# Patient Record
Sex: FEMALE | Race: WHITE | Employment: UNEMPLOYED | URBAN - METROPOLITAN AREA
[De-identification: names, ages, dates, MRNs, and addresses within clinical notes are randomized per-mention and may not be internally consistent; named-entity substitution may affect disease eponyms.]

---

## 2017-04-05 ENCOUNTER — HOSPITAL ENCOUNTER (OUTPATIENT)
Dept: MRI IMAGING | Age: 52
Discharge: HOME | End: 2017-04-05
Attending: FAMILY MEDICINE
Payer: MEDICAID

## 2017-04-05 ENCOUNTER — HOSPITAL ENCOUNTER (OUTPATIENT)
Dept: CT IMAGING | Age: 52
Discharge: HOME | End: 2017-04-05
Attending: FAMILY MEDICINE
Payer: MEDICAID

## 2017-04-05 DIAGNOSIS — R51.9 HEAD ACHE: ICD-10-CM

## 2017-04-05 DIAGNOSIS — R04.89: ICD-10-CM

## 2017-04-05 PROCEDURE — 82565 ASSAY OF CREATININE: CPT

## 2017-04-05 PROCEDURE — 70551 MRI BRAIN STEM W/O DYE: CPT

## 2017-04-05 PROCEDURE — 71260 CT THORAX DX C+: CPT

## 2017-09-19 PROBLEM — K21.9 GERD (GASTROESOPHAGEAL REFLUX DISEASE): Status: ACTIVE | Noted: 2017-09-19

## 2017-09-19 PROBLEM — Z12.11 SCREEN FOR COLON CANCER: Status: ACTIVE | Noted: 2017-09-19

## 2017-11-16 ENCOUNTER — HOSPITAL ENCOUNTER (OUTPATIENT)
Dept: MAMMOGRAPHY | Age: 52
Discharge: HOME | End: 2017-11-16
Attending: FAMILY MEDICINE
Payer: MEDICAID

## 2017-11-16 DIAGNOSIS — Z12.39 ENCOUNTER FOR SCREENING FOR MALIGNANT NEOPLASM OF BREAST: ICD-10-CM

## 2017-11-16 PROCEDURE — 77067 SCR MAMMO BI INCL CAD: CPT | Performed by: FAMILY MEDICINE

## 2017-11-28 ENCOUNTER — NURSE NAVIGATOR ENCOUNTER (OUTPATIENT)
Dept: HEMATOLOGY/ONCOLOGY | Facility: HOSPITAL | Age: 52
End: 2017-11-28

## 2017-11-28 ENCOUNTER — HOSPITAL ENCOUNTER (OUTPATIENT)
Dept: ULTRASOUND IMAGING | Facility: HOSPITAL | Age: 52
Discharge: HOME | End: 2017-11-28
Attending: FAMILY MEDICINE
Payer: MEDICAID

## 2017-11-28 ENCOUNTER — HOSPITAL ENCOUNTER (OUTPATIENT)
Dept: MAMMOGRAPHY | Facility: HOSPITAL | Age: 52
Discharge: HOME | End: 2017-11-28
Attending: FAMILY MEDICINE
Payer: MEDICAID

## 2017-11-28 DIAGNOSIS — R92.8 ABNORMAL MAMMOGRAM: ICD-10-CM

## 2017-11-28 PROCEDURE — 76642 ULTRASOUND BREAST LIMITED: CPT | Performed by: FAMILY MEDICINE

## 2017-11-28 PROCEDURE — 77065 DX MAMMO INCL CAD UNI: CPT | Performed by: FAMILY MEDICINE

## 2017-11-28 NOTE — PROGRESS NOTES
Navigator called to patient to discuss breast biopsy recommendation from this morning. Message left for pt to call Navigator back.

## 2017-11-29 NOTE — PROGRESS NOTES
Received message from patient's daughter requesting to schedule an appointment. Navigator called to patient. Conversation assisted by  #[id number]. Discussed recommended breast biopsy.     Reviewed pertinent patient history, family history of ca procedure. Discussed with patient that some soreness and bruising is normal after biopsy but that prolonged or increased pain and bruising should be reported to the ordering physician.    Reviewed results process with patient and discussed that Alanna Ayala

## 2017-12-07 ENCOUNTER — NURSE NAVIGATOR ENCOUNTER (OUTPATIENT)
Dept: HEMATOLOGY/ONCOLOGY | Facility: HOSPITAL | Age: 52
End: 2017-12-07

## 2017-12-07 NOTE — PROGRESS NOTES
Navigator called to patient with the assistance of  #[id number]. Following up from patient cancelling breast biopsy (originally scheduled for today, 12/7/17).       Per patient, she wishes to wait to have breast biopsy completed after the holidays,

## 2018-01-08 ENCOUNTER — NURSE NAVIGATOR ENCOUNTER (OUTPATIENT)
Dept: HEMATOLOGY/ONCOLOGY | Facility: HOSPITAL | Age: 53
End: 2018-01-08

## 2018-01-08 NOTE — PROGRESS NOTES
Navigator called to patient to follow-up from breast biopsy recommendation and previous conversations regarding breast biopsy and potentially scheduling appt after the new year. Message left for patient to call back.

## 2018-01-11 ENCOUNTER — NURSE NAVIGATOR ENCOUNTER (OUTPATIENT)
Dept: HEMATOLOGY/ONCOLOGY | Facility: HOSPITAL | Age: 53
End: 2018-01-11

## 2018-01-11 NOTE — PROGRESS NOTES
Navigator called to patient to follow-up regarding breast biopsy - with the assistance of  #[id number]. Message was left for pt to call back.

## 2018-01-15 ENCOUNTER — NURSE NAVIGATOR ENCOUNTER (OUTPATIENT)
Dept: HEMATOLOGY/ONCOLOGY | Facility: HOSPITAL | Age: 53
End: 2018-01-15

## 2018-01-15 NOTE — PROGRESS NOTES
Called to patient with the assistance of  #[id number]. A message was left requesting the patient to call Navigator back.

## 2018-01-16 ENCOUNTER — NURSE NAVIGATOR ENCOUNTER (OUTPATIENT)
Dept: HEMATOLOGY/ONCOLOGY | Facility: HOSPITAL | Age: 53
End: 2018-01-16

## 2018-01-16 NOTE — PROGRESS NOTES
Navigator called to Dr. Garcia Overall office to notify of unsuccessful attempts to reach patient to schedule breast biopsy.  (1/8/18 message left in [address] and 1/11 + 1/15 - messages left in [place] for patient to return call - see previous encounters.)  Pt was

## 2018-01-24 ENCOUNTER — NURSE NAVIGATOR ENCOUNTER (OUTPATIENT)
Dept: HEMATOLOGY/ONCOLOGY | Facility: HOSPITAL | Age: 53
End: 2018-01-24

## 2018-01-24 NOTE — PROGRESS NOTES
Navigator called to patient to discuss breast biopsy and coordination of appointments. Call assisted by  #[id number]. Message left for patient to call Navigator or Radiology Scheduling back.

## 2018-01-29 ENCOUNTER — NURSE NAVIGATOR ENCOUNTER (OUTPATIENT)
Dept: HEMATOLOGY/ONCOLOGY | Facility: HOSPITAL | Age: 53
End: 2018-01-29

## 2018-01-29 NOTE — PROGRESS NOTES
Called to patient with the assistance of  #[id number]. Medications reviewed with patient.       - Discussed with patient Radiology’s protocol for being off blood thinning medications (including ibuprofen/advil/aleve/aspirin) 5 days prior to biopsy

## 2018-01-30 ENCOUNTER — NURSE NAVIGATOR ENCOUNTER (OUTPATIENT)
Dept: HEMATOLOGY/ONCOLOGY | Facility: HOSPITAL | Age: 53
End: 2018-01-30

## 2018-01-30 NOTE — PROGRESS NOTES
Called to patient with the assistance of  #[id number] to follow-up regarding breast biopsy scheduling. Pt wishes to schedule breast biopsy at this time. Patient and  transferred to discuss with Jasson in Radiology scheduling.

## 2018-02-02 ENCOUNTER — HOSPITAL ENCOUNTER (OUTPATIENT)
Dept: MAMMOGRAPHY | Facility: HOSPITAL | Age: 53
Discharge: HOME | End: 2018-02-02
Attending: FAMILY MEDICINE
Payer: MEDICAID

## 2018-02-02 ENCOUNTER — HOSPITAL ENCOUNTER (OUTPATIENT)
Dept: ULTRASOUND IMAGING | Facility: HOSPITAL | Age: 53
Discharge: HOME | End: 2018-02-02
Attending: FAMILY MEDICINE
Payer: MEDICAID

## 2018-02-02 VITALS
OXYGEN SATURATION: 97 % | SYSTOLIC BLOOD PRESSURE: 137 MMHG | HEART RATE: 84 BPM | RESPIRATION RATE: 16 BRPM | DIASTOLIC BLOOD PRESSURE: 76 MMHG

## 2018-02-02 DIAGNOSIS — N63.0 BREAST MASS: ICD-10-CM

## 2018-02-02 PROCEDURE — 19083 BX BREAST 1ST LESION US IMAG: CPT | Performed by: FAMILY MEDICINE

## 2018-02-02 PROCEDURE — 19084 BX BREAST ADD LESION US IMAG: CPT | Performed by: FAMILY MEDICINE

## 2018-02-02 PROCEDURE — 77065 DX MAMMO INCL CAD UNI: CPT | Performed by: FAMILY MEDICINE

## 2018-02-02 PROCEDURE — 88305 TISSUE EXAM BY PATHOLOGIST: CPT | Performed by: FAMILY MEDICINE

## 2018-02-02 NOTE — IMAGING NOTE
PT ARRIVED TO ROOM 4   SCANS BY [address] [place] [address];  HX TAKEN PROCEDURE EXPLAINED QUESTIONS ANSWERED;    Pt history discussed as below:                                                Family history of cancer:                  Father - prostate

## 2018-02-02 NOTE — PROCEDURES
[facility]  Procedure Note    Theodora Wilcox Patient Status:  Outpatient    1965 MRN [id number]   Location [address] [facility] Attending Orville Malloy MD   Hosp Day # 0 PCP Jasmyn Quinn MD     Procedure: Joey Klein

## 2018-02-05 ENCOUNTER — NURSE NAVIGATOR ENCOUNTER (OUTPATIENT)
Dept: HEMATOLOGY/ONCOLOGY | Facility: HOSPITAL | Age: 53
End: 2018-02-05

## 2018-02-05 NOTE — PROGRESS NOTES
Called to patient with the assistance of  #[id number]. Patient was referred to Results Clinic by Dr. Dorian Carter. Pt noted post-biopsy pain is better today; discussed notifying Dr. Dorian Carter if pain does not go away or if it gets worse.       Discussed benign

## 2018-02-20 ENCOUNTER — MED REC SCAN ONLY (OUTPATIENT)
Dept: HEMATOLOGY/ONCOLOGY | Facility: HOSPITAL | Age: 53
End: 2018-02-20

## 2019-09-25 ENCOUNTER — HOSPITAL ENCOUNTER (OUTPATIENT)
Age: 54
Discharge: LEFT BEFORE DISCHARGE | End: 2019-09-25
Attending: EMERGENCY MEDICINE
Payer: MEDICAID

## 2019-09-25 ENCOUNTER — HOSPITAL ENCOUNTER (OUTPATIENT)
Dept: GENERAL RADIOLOGY | Age: 54
Discharge: HOME | End: 2019-09-25
Attending: FAMILY MEDICINE
Payer: MEDICAID

## 2019-09-25 DIAGNOSIS — M54.50 LUMBAGO: ICD-10-CM

## 2019-09-25 PROCEDURE — 72110 X-RAY EXAM L-2 SPINE 4/>VWS: CPT | Performed by: FAMILY MEDICINE

## 2019-10-04 ENCOUNTER — HOSPITAL ENCOUNTER (OUTPATIENT)
Dept: MRI IMAGING | Age: 54
Discharge: HOME | End: 2019-10-04
Attending: FAMILY MEDICINE
Payer: MEDICAID

## 2019-10-04 DIAGNOSIS — M48.00 SPINAL STENOSIS: ICD-10-CM

## 2019-10-04 PROCEDURE — 72148 MRI LUMBAR SPINE W/O DYE: CPT | Performed by: FAMILY MEDICINE

## 2021-05-19 ENCOUNTER — HOSPITAL ENCOUNTER (EMERGENCY)
Facility: HOSPITAL | Age: 56
Discharge: HOME | End: 2021-05-20
Attending: EMERGENCY MEDICINE
Payer: MEDICAID

## 2021-05-19 ENCOUNTER — APPOINTMENT (OUTPATIENT)
Dept: CT IMAGING | Facility: HOSPITAL | Age: 56
End: 2021-05-19
Attending: EMERGENCY MEDICINE
Payer: MEDICAID

## 2021-05-19 DIAGNOSIS — R42 DIZZINESS: Primary | ICD-10-CM

## 2021-05-19 DIAGNOSIS — R51.9 NONINTRACTABLE HEADACHE, UNSPECIFIED CHRONICITY PATTERN, UNSPECIFIED HEADACHE TYPE: ICD-10-CM

## 2021-05-19 PROCEDURE — 84484 ASSAY OF TROPONIN QUANT: CPT | Performed by: EMERGENCY MEDICINE

## 2021-05-19 PROCEDURE — 80048 BASIC METABOLIC PNL TOTAL CA: CPT | Performed by: EMERGENCY MEDICINE

## 2021-05-19 PROCEDURE — 80048 BASIC METABOLIC PNL TOTAL CA: CPT

## 2021-05-19 PROCEDURE — 85025 COMPLETE CBC W/AUTO DIFF WBC: CPT | Performed by: EMERGENCY MEDICINE

## 2021-05-19 PROCEDURE — 84484 ASSAY OF TROPONIN QUANT: CPT

## 2021-05-19 PROCEDURE — 93010 ELECTROCARDIOGRAM REPORT: CPT | Performed by: EMERGENCY MEDICINE

## 2021-05-19 PROCEDURE — 99285 EMERGENCY DEPT VISIT HI MDM: CPT

## 2021-05-19 PROCEDURE — 93005 ELECTROCARDIOGRAM TRACING: CPT

## 2021-05-19 PROCEDURE — 36415 COLL VENOUS BLD VENIPUNCTURE: CPT

## 2021-05-19 PROCEDURE — 99284 EMERGENCY DEPT VISIT MOD MDM: CPT

## 2021-05-19 PROCEDURE — 85025 COMPLETE CBC W/AUTO DIFF WBC: CPT

## 2021-05-19 PROCEDURE — 70450 CT HEAD/BRAIN W/O DYE: CPT | Performed by: EMERGENCY MEDICINE

## 2021-05-19 RX ORDER — DIAZEPAM 5 MG/1
5 TABLET ORAL ONCE
Status: COMPLETED | OUTPATIENT
Start: 2021-05-19 | End: 2021-05-19

## 2021-05-19 RX ORDER — OXYBUTYNIN CHLORIDE 5 MG/1
5 TABLET ORAL 3 TIMES DAILY
COMMUNITY

## 2021-05-19 RX ORDER — OMEPRAZOLE 20 MG/1
20 CAPSULE, DELAYED RELEASE ORAL
COMMUNITY

## 2021-05-19 RX ORDER — BUDESONIDE AND FORMOTEROL FUMARATE DIHYDRATE 160; 4.5 UG/1; UG/1
AEROSOL RESPIRATORY (INHALATION) 2 TIMES DAILY
COMMUNITY

## 2021-05-20 VITALS
DIASTOLIC BLOOD PRESSURE: 66 MMHG | BODY MASS INDEX: 33.99 KG/M2 | HEIGHT: 61 IN | OXYGEN SATURATION: 97 % | WEIGHT: 180 LBS | RESPIRATION RATE: 18 BRPM | HEART RATE: 67 BPM | TEMPERATURE: 97 F | SYSTOLIC BLOOD PRESSURE: 120 MMHG

## 2021-05-20 RX ORDER — MECLIZINE HYDROCHLORIDE 25 MG/1
25 TABLET ORAL 4 TIMES DAILY
Qty: 20 TABLET | Refills: 0 | Status: SHIPPED | OUTPATIENT
Start: 2021-05-20 | End: 2021-06-09

## 2021-05-20 NOTE — ED PROVIDER NOTES
Patient Seen in: [facility] AND [facility] Emergency Department    History   Patient presents with:  Dizziness      HPI    The patient presents to the ED complaining of intermittent high blood pressure today as well as intermittent dizziness.   She states that [facility] was performed prior to and after the exam.  Stethoscope and any equipment used during my examination was cleaned with super sani-cloth germicidal wipes following the exam.     Physical Exam  Vitals and nursing note reviewed.    Constitutional:       General Final result                                                 Please view results for these tests on the individual orders.    RAINBOW DRAW LAVENDER   RAINBOW DRAW LIGHT GREEN   RAINBOW DRAW GOLD   CBC W/ DIFFERENTIAL     EKG    Rate, 75 67   Resp: 18    Temp: 97.2 °F (36.2 °C)    TempSrc: Temporal    SpO2: 97% 97%   Weight: 81.6 kg    Height: 154.9 cm (5' 1\")      *I personally reviewed and interpreted all ED vitals.     Pulse Ox: 97%, Room air, Normal     Monitor Interpretation:   nor

## 2021-05-20 NOTE — ED INITIAL ASSESSMENT (HPI)
Pt ambulatory into triage with steady gait. Pt c/o dizziness and fluctuating BPs today. Pt describes dizziness as feeling like she is walking on clouds. Pt is aox4.   Denies CP, SOB

## 2022-12-12 ENCOUNTER — HOSPITAL ENCOUNTER (OUTPATIENT)
Dept: ULTRASOUND IMAGING | Facility: HOSPITAL | Age: 57
Discharge: HOME | End: 2022-12-12
Attending: FAMILY MEDICINE
Payer: MEDICAID

## 2022-12-12 DIAGNOSIS — E04.1 NONTOXIC SINGLE THYROID NODULE: ICD-10-CM

## 2022-12-12 PROCEDURE — 76536 US EXAM OF HEAD AND NECK: CPT | Performed by: FAMILY MEDICINE

## 2023-12-12 ENCOUNTER — HOSPITAL ENCOUNTER (OUTPATIENT)
Dept: GENERAL RADIOLOGY | Age: 58
Discharge: HOME | End: 2023-12-12
Attending: NURSE PRACTITIONER
Payer: MEDICAID

## 2023-12-12 DIAGNOSIS — M25.511 PAIN IN RIGHT SHOULDER: ICD-10-CM

## 2023-12-12 PROCEDURE — 73030 X-RAY EXAM OF SHOULDER: CPT | Performed by: NURSE PRACTITIONER

## (undated) NOTE — LETTER
[address] [place] [address]  Authorization for Surgical Operation or Procedure  Date: ______________       Time: _______________  1.  I hereby authorize Dr. Juan Ramon Park , my physician and the assistant, to perform the following operation and/ 5. I consent to the photographing of the operations or procedures to be performed for the purposes of advancing medicine, science, and/or education, provided my identity is not revealed.  If the procedure has been videotaped, the physician/surgeon will obta risks and benefits involved in the proposed treatment and any reasonable alternative to the proposed treatment. I have also explained the risks and benefits involved in the refusal of the proposed treatment and have answered the patient's questions.  If I h